# Patient Record
Sex: FEMALE | Race: OTHER
[De-identification: names, ages, dates, MRNs, and addresses within clinical notes are randomized per-mention and may not be internally consistent; named-entity substitution may affect disease eponyms.]

---

## 2018-02-09 ENCOUNTER — HOSPITAL ENCOUNTER (EMERGENCY)
Dept: HOSPITAL 17 - NEPC | Age: 68
LOS: 1 days | Discharge: HOME | End: 2018-02-10
Payer: COMMERCIAL

## 2018-02-09 VITALS
HEART RATE: 80 BPM | TEMPERATURE: 97.9 F | DIASTOLIC BLOOD PRESSURE: 83 MMHG | RESPIRATION RATE: 18 BRPM | SYSTOLIC BLOOD PRESSURE: 140 MMHG | OXYGEN SATURATION: 98 %

## 2018-02-09 VITALS — BODY MASS INDEX: 37.54 KG/M2 | HEIGHT: 67 IN | WEIGHT: 239.2 LBS

## 2018-02-09 VITALS — OXYGEN SATURATION: 95 %

## 2018-02-09 DIAGNOSIS — M54.9: ICD-10-CM

## 2018-02-09 DIAGNOSIS — E11.9: ICD-10-CM

## 2018-02-09 DIAGNOSIS — I10: ICD-10-CM

## 2018-02-09 DIAGNOSIS — R51: Primary | ICD-10-CM

## 2018-02-09 DIAGNOSIS — R94.31: ICD-10-CM

## 2018-02-09 DIAGNOSIS — R25.1: ICD-10-CM

## 2018-02-09 DIAGNOSIS — S29.012A: ICD-10-CM

## 2018-02-09 DIAGNOSIS — E78.5: ICD-10-CM

## 2018-02-09 DIAGNOSIS — R60.0: ICD-10-CM

## 2018-02-09 LAB
ALBUMIN SERPL-MCNC: 3.9 GM/DL (ref 3.4–5)
ALP SERPL-CCNC: 94 U/L (ref 45–117)
ALT SERPL-CCNC: 29 U/L (ref 10–53)
AST SERPL-CCNC: 34 U/L (ref 15–37)
BASOPHILS # BLD AUTO: 0.1 TH/MM3 (ref 0–0.2)
BASOPHILS NFR BLD: 0.6 % (ref 0–2)
BILIRUB SERPL-MCNC: 0.8 MG/DL (ref 0.2–1)
BUN SERPL-MCNC: 13 MG/DL (ref 7–18)
CALCIUM SERPL-MCNC: 8.9 MG/DL (ref 8.5–10.1)
CHLORIDE SERPL-SCNC: 102 MEQ/L (ref 98–107)
CREAT SERPL-MCNC: 0.76 MG/DL (ref 0.5–1)
EOSINOPHIL # BLD: 0.1 TH/MM3 (ref 0–0.4)
EOSINOPHIL NFR BLD: 0.7 % (ref 0–4)
ERYTHROCYTE [DISTWIDTH] IN BLOOD BY AUTOMATED COUNT: 14.7 % (ref 11.6–17.2)
GFR SERPLBLD BASED ON 1.73 SQ M-ARVRAT: 76 ML/MIN (ref 89–?)
GLUCOSE SERPL-MCNC: 97 MG/DL (ref 74–106)
HCO3 BLD-SCNC: 25.5 MEQ/L (ref 21–32)
HCT VFR BLD CALC: 44.6 % (ref 35–46)
HGB BLD-MCNC: 14.9 GM/DL (ref 11.6–15.3)
INR PPP: 1.1 RATIO
LYMPHOCYTES # BLD AUTO: 3 TH/MM3 (ref 1–4.8)
LYMPHOCYTES NFR BLD AUTO: 24.4 % (ref 9–44)
MCH RBC QN AUTO: 28 PG (ref 27–34)
MCHC RBC AUTO-ENTMCNC: 33.5 % (ref 32–36)
MCV RBC AUTO: 83.7 FL (ref 80–100)
MONOCYTE #: 1.2 TH/MM3 (ref 0–0.9)
MONOCYTES NFR BLD: 9.4 % (ref 0–8)
NEUTROPHILS # BLD AUTO: 8.1 TH/MM3 (ref 1.8–7.7)
NEUTROPHILS NFR BLD AUTO: 64.9 % (ref 16–70)
PLATELET # BLD: 228 TH/MM3 (ref 150–450)
PMV BLD AUTO: 9.3 FL (ref 7–11)
PROT SERPL-MCNC: 8.2 GM/DL (ref 6.4–8.2)
PROTHROMBIN TIME: 10.7 SEC (ref 9.8–11.6)
RBC # BLD AUTO: 5.32 MIL/MM3 (ref 4–5.3)
SODIUM SERPL-SCNC: 138 MEQ/L (ref 136–145)
TROPONIN I SERPL-MCNC: (no result) NG/ML (ref 0.02–0.05)
WBC # BLD AUTO: 12.5 TH/MM3 (ref 4–11)

## 2018-02-09 PROCEDURE — 85610 PROTHROMBIN TIME: CPT

## 2018-02-09 PROCEDURE — 81001 URINALYSIS AUTO W/SCOPE: CPT

## 2018-02-09 PROCEDURE — 96376 TX/PRO/DX INJ SAME DRUG ADON: CPT

## 2018-02-09 PROCEDURE — 84484 ASSAY OF TROPONIN QUANT: CPT

## 2018-02-09 PROCEDURE — 96361 HYDRATE IV INFUSION ADD-ON: CPT

## 2018-02-09 PROCEDURE — 80053 COMPREHEN METABOLIC PANEL: CPT

## 2018-02-09 PROCEDURE — 96374 THER/PROPH/DIAG INJ IV PUSH: CPT

## 2018-02-09 PROCEDURE — 70548 MR ANGIOGRAPHY NECK W/DYE: CPT

## 2018-02-09 PROCEDURE — 72072 X-RAY EXAM THORAC SPINE 3VWS: CPT

## 2018-02-09 PROCEDURE — 70553 MRI BRAIN STEM W/O & W/DYE: CPT

## 2018-02-09 PROCEDURE — 93005 ELECTROCARDIOGRAM TRACING: CPT

## 2018-02-09 PROCEDURE — 71275 CT ANGIOGRAPHY CHEST: CPT

## 2018-02-09 PROCEDURE — 87086 URINE CULTURE/COLONY COUNT: CPT

## 2018-02-09 PROCEDURE — 70544 MR ANGIOGRAPHY HEAD W/O DYE: CPT

## 2018-02-09 PROCEDURE — 96375 TX/PRO/DX INJ NEW DRUG ADDON: CPT

## 2018-02-09 PROCEDURE — 85730 THROMBOPLASTIN TIME PARTIAL: CPT

## 2018-02-09 PROCEDURE — 85025 COMPLETE CBC W/AUTO DIFF WBC: CPT

## 2018-02-09 PROCEDURE — 74174 CTA ABD&PLVS W/CONTRAST: CPT

## 2018-02-09 PROCEDURE — 71045 X-RAY EXAM CHEST 1 VIEW: CPT

## 2018-02-09 PROCEDURE — A9579 GAD-BASE MR CONTRAST NOS,1ML: HCPCS

## 2018-02-09 PROCEDURE — 99285 EMERGENCY DEPT VISIT HI MDM: CPT

## 2018-02-09 NOTE — RADRPT
EXAM DATE/TIME:  02/09/2018 21:34 

 

[facility] COMPARISON:     

No previous studies available for comparison.

 

                     

INDICATIONS :     

Difficulty breathing for 4 days

                     

 

MEDICAL HISTORY :     

None.          

 

SURGICAL HISTORY :     

None.   

 

ENCOUNTER:     

Initial                                        

 

ACUITY:     

4 - 6 days      

 

PAIN SCORE:     

0/10

 

LOCATION:     

Bilateral chest 

 

FINDINGS:     

A single view of the chest demonstrates the lungs to be symmetrically aerated without evidence of mas
s, infiltrate or effusion.  The cardiomediastinal contours are unremarkable.  Osseous structures are 
intact.

 

CONCLUSION:     No acute disease.  

 

 

 

 Corby Merlos MD on February 09, 2018 at 23:26           

Board Certified Radiologist.

 This report was verified electronically.

## 2018-02-09 NOTE — RADRPT
EXAM DATE/TIME:  02/09/2018 21:37 

 

[facility] COMPARISON:     

No previous studies available for comparison.

 

                     

INDICATIONS :     

Back pain for 4 days. Patient states she fell 2 weeks ago

                     

 

MEDICAL HISTORY :     

None.          

 

SURGICAL HISTORY :     

None.   

 

ENCOUNTER:     

Initial                                        

 

ACUITY:     

4 - 6 days      

 

PAIN SCORE:     

10/10

 

LOCATION:       

Thoracic spine

 

FINDINGS:     

There is normal alignment of the thoracic vertebral bodies.  Vertebral body height is maintained.  No
 evidence of fracture or subluxation.  Pedicles are intact at all levels.  The paravertebral reflecti
ons are not thickened. There is primary degenerative changes involving the mid to lower thoracic spin
e.

 

CONCLUSION:     

Primary bony degenerative changes involving the mid to lower thoracic spine.

 

 

 

 Wilman Chin MD on February 09, 2018 at 21:47           

Board Certified Radiologist.

 This report was verified electronically.

## 2018-02-09 NOTE — PD
HPI


Chief Complaint:  Headache


Time Seen by Provider:  21:20


Travel History


International Travel<30 days:  No


Contact w/Intl Traveler<30days:  No


Traveled to known affect area:  No





History of Present Illness


HPI


67-year-old female presents to the emergency department by private 

transportation of the care of her spouse for complaint of mid upper back pain 

neck pain and head pain.  Patient states she has had symptoms since Tuesday.  

Patient denies fever chills sweats nausea vomiting sore throat sinus pressure 

drainage cough congestion shortness of breath abdominal pain or new lower 

extremity numbness tingling or weakness.  Patient states that she has noted 

worsening tremor of the left upper extremity and mild tremor to the right upper 

extremity.  Patient states she has had intermittent tremor affecting the left 

upper extremity for several years after being diagnosed and treated for breast 

cancer with chemotherapy.  Patient states since Tuesday symptoms have worsened 

and are persistent and now she cannot write using her left hand and she is left-

handed.  Patient states that her pain is moderate to severe and is not improved 

after recently be seen in the emergency department and treated with ibuprofen, 

Percocet, and a Medrol Dosepak.  Patient is diabetic has history of 

hypertension dyslipidemia diabetes remote breast cancer with bilateral 

mastectomy chemotherapy and more recent a left lymph node biopsy December 2017 

that was reportedly benign as well as prior cholecystectomy hysterectomy and 

infusa-port removal.  Patient states that approximately 2 weeks ago she did 

have a near fall but did not land on her back did not injure her neck or her 

head did not hit her head did not have loss of consciousness was asymptomatic 

after the fall.  Patient states she was not having any symptoms of soreness or 

muscle spasm and had not noticed any increasing tremor of her left upper 

extremity after the fall.  No bladder or bowel dysfunction, no saddle anesthesia

, and no lower extremity numbness tingling or weakness.   Patient denies any 

lower back pain.  Patient does not report any history of diabetic neuropathy.  

No history of peripheral neuropathy.  Patient was doing well denies any injury 

or change in activity or change in medications Sunday or Monday of this week 

and then suddenly Tuesday started noticing severe head pain neck pain and upper 

back pain.  Patient was reportedly seen in the emergency department a CT brain 

noncontrast was performed and revealed no acute abnormality.  Patient was seen 

the next day by her primary care provider and no change in medications were 

provided and no further testing was performed as the provider did not 

reportedly have access to her CT imaging report.





[facility]


Past Medical History


*** Narrative Medical


Breast cancer status post bilateral mastectomy chemotherapy hypertension 

dyslipidemia diabetes hysterectomy cholecystectomy; no tobacco use no alcohol 

use and substance use; nursing notes reviewed


Cancer:  Yes


Cardiovascular Problems:  Yes (HTN)


Diabetes:  Yes (Metformin )


Patient Takes Glucophage:  Yes


Diminished Hearing:  No


Hypertension:  Yes


Immunizations Current:  Yes


Tetanus Vaccination:  > 5 Years


Influenza Vaccination:  No


Pregnant?:  Not Pregnant





Past Surgical History


Cholecystectomy:  Yes


Hysterectomy:  Yes


Mastectomy:  Yes





Social History


Alcohol Use:  No


Tobacco Use:  No


Substance Use:  No





Allergies-Medications


(Allergen,Severity, Reaction):  


Coded Allergies:  


     No Known Allergies (Verified  Allergy, Unknown, 2/7/18)


Reported Meds & Prescriptions





Reported Meds & Active Scripts


Active


Robaxin (Methocarbamol) 750 Mg Tab 750 Mg PO Q6HR


Ibuprofen 600 Mg Tab 600 Mg PO Q8HR PRN


Percocet (Oxycodone-Acetaminophen) 5-325 mg Tab 1 Tab PO Q4H PRN


Medrol Dosepak (Methylprednisolone) 4 Mg Dspk 4 Mg PO AS DIRECTED


     Per Pharmacist direction








Review of Systems


Except as stated in HPI:  all other systems reviewed are Neg


General / Constitutional:  No: Fever, Chills


Eyes:  No: Diploplia, Blurred Vision


HENT:  Positive: Headaches, Neck Pain, No: Neck Stiffness


Cardiovascular:  No: Chest Pain or Discomfort


Respiratory:  No: Shortness of Breath


Gastrointestinal:  No: Nausea, Vomiting, Abdominal Pain


Genitourinary:  No: Urgency, Frequency, Dysuria, Hesitancy, Incontinence, Flank 

Pain


Musculoskeletal:  Positive: Myalgias, Arthralgias, Edema (Upper back), Pain


Skin:  No Rash ( bilateral lower leg ankle and pedal edema)


Neurologic:  Positive: Coordination Problem (Tremor with pass pointing left 

upper extremity), Tremor, Headache, No: Weakness, Dizziness, Syncope, Focal 

Abnormalities, Ataxia, Change in Mentation, Slurred Speech, Paresthesia, 

Incontinence, Seizures, Sensory Disturbance


Psychiatric:  Positive: Anxiety


Endocrine:  No: Heat Intolerance


Hematologic/Lymphatic:  No: Easy Bruising





Physical Exam


Narrative


GENERAL: Well-developed well-nourished female no acute distress or respiratory 

distress; GCS 15


SKIN: Warm and dry.


HEAD: Atraumatic. Normocephalic. 


EYES: Pupils equal and round. No scleral icterus. No injection or drainage. 


ENT: No nasal bleeding or discharge.  Mucous membranes pink and moist.


NECK: Trachea midline. No JVD. 


CARDIOVASCULAR: Regular rate and rhythm.  


RESPIRATORY: No accessory muscle use. Clear to auscultation. Breath sounds 

equal bilaterally. 


GASTROINTESTINAL: Abdomen soft, non-tender, nondistended. Hepatic and splenic 

margins not palpable. 


MUSCULOSKELETAL: Extremities without clubbing, cyanosis, bilateral lower leg 

ankle and pedal edema bilaterally. No obvious deformities.   to 

palpation along the mid scapular thoracic spine no bony step-off no erythema no 

ecchymosis no abrasion soft tissue swelling induration or increased warmth.


NEUROLOGICAL: Awake and alert. No obvious cranial nerve deficits.  Motor 

grossly within normal limits. Five out of 5 muscle strength in the arms and 

legs.  Left upper extremity tremor with pass pointing with finger to nose 

testing.  No lower extremity limb ataxia.  No pronator drift.  No sensory 

deficit.  No clonus.  Normal speech.


PSYCHIATRIC: Appropriate mood and affect; insight and judgment normal.





Data


Data


Last Documented VS





Vital Signs








  Date Time  Temp Pulse Resp B/P (MAP) Pulse Ox O2 Delivery O2 Flow Rate FiO2


 


2/9/18 22:12     95 Room Air  


 


2/9/18 20:26 97.9 80 18     








Orders





 Orders


Electrocardiogram (2/9/18 21:20)


Prothrombin Time / Inr (Pt) (2/9/18 21:20)


Act Partial Throm Time (Ptt) (2/9/18 21:20)


Complete Blood Count With Diff (2/9/18 21:20)


Comprehensive Metabolic Panel (2/9/18 21:20)


Troponin I (2/9/18 21:20)


Urinalysis - C+S If Indicated (2/9/18 21:20)


Chest, Single Ap (2/9/18 21:20)


Ecg Monitoring (2/9/18 21:20)


Iv Access Insert/Monitor (2/9/18 21:20)


Oximetry (2/9/18 21:20)


Blood Glucose (2/9/18 21:20)


Sodium Chloride 0.9% Flush (Ns Flush) (2/9/18 21:30)


Spine, Thoracic-Ap/Lat/Sw(3vw) (2/9/18 )


Ondansetron Inj (Zofran Inj) (2/9/18 21:30)


Morphine Inj (Morphine Inj) (2/9/18 21:30)


Cta Thor Abd Aorta W Iv C W3d (2/9/18 )


Mri Brain W&W/O Contrast (2/9/18 )


Mra Brain W/O Contrast (Cow) (2/9/18 )


Mra Carotids W Contrast (2/9/18 )


Gadodiamide Pf Inj (Omniscan Pf Inj) (2/9/18 20:26)


Morphine Inj (Morphine Inj) (2/10/18 01:30)


Ketorolac Inj (Toradol Inj) (2/10/18 02:15)


Urine Culture (2/10/18 02:19)


Sodium Chlor 0.9% 1000 Ml Inj (Ns 1000 M (2/10/18 03:00)


Ed Discharge Order (2/10/18 03:56)





Labs





Laboratory Tests








Test


  2/9/18


22:21 2/10/18


02:19


 


White Blood Count 12.5 TH/MM3  


 


Red Blood Count 5.32 MIL/MM3  


 


Hemoglobin 14.9 GM/DL  


 


Hematocrit 44.6 %  


 


Mean Corpuscular Volume 83.7 FL  


 


Mean Corpuscular Hemoglobin 28.0 PG  


 


Mean Corpuscular Hemoglobin


Concent 33.5 % 


  


 


 


Red Cell Distribution Width 14.7 %  


 


Platelet Count 228 TH/MM3  


 


Mean Platelet Volume 9.3 FL  


 


Neutrophils (%) (Auto) 64.9 %  


 


Lymphocytes (%) (Auto) 24.4 %  


 


Monocytes (%) (Auto) 9.4 %  


 


Eosinophils (%) (Auto) 0.7 %  


 


Basophils (%) (Auto) 0.6 %  


 


Neutrophils # (Auto) 8.1 TH/MM3  


 


Lymphocytes # (Auto) 3.0 TH/MM3  


 


Monocytes # (Auto) 1.2 TH/MM3  


 


Eosinophils # (Auto) 0.1 TH/MM3  


 


Basophils # (Auto) 0.1 TH/MM3  


 


CBC Comment DIFF FINAL  


 


Differential Comment   


 


Prothrombin Time 10.7 SEC  


 


Prothromb Time International


Ratio 1.1 RATIO 


  


 


 


Activated Partial


Thromboplast Time 22.8 SEC 


  


 


 


Blood Urea Nitrogen 13 MG/DL  


 


Creatinine 0.76 MG/DL  


 


Random Glucose 97 MG/DL  


 


Total Protein 8.2 GM/DL  


 


Albumin 3.9 GM/DL  


 


Calcium Level 8.9 MG/DL  


 


Alkaline Phosphatase 94 U/L  


 


Aspartate Amino Transf


(AST/SGOT) 34 U/L 


  


 


 


Alanine Aminotransferase


(ALT/SGPT) 29 U/L 


  


 


 


Total Bilirubin 0.8 MG/DL  


 


Sodium Level 138 MEQ/L  


 


Potassium Level 3.8 MEQ/L  


 


Chloride Level 102 MEQ/L  


 


Carbon Dioxide Level 25.5 MEQ/L  


 


Anion Gap 11 MEQ/L  


 


Estimat Glomerular Filtration


Rate 76 ML/MIN 


  


 


 


Troponin I


  LESS THAN 0.02


NG/ML 


 


 


Urine Color  YELLOW 


 


Urine Turbidity  CLEAR 


 


Urine pH  6.0 


 


Urine Specific Gravity


  


  GREATER THAN


1.050


 


Urine Protein  TRACE mg/dL 


 


Urine Glucose (UA)  NEG mg/dL 


 


Urine Ketones  10 mg/dL 


 


Urine Occult Blood  NEG 


 


Urine Nitrite  NEG 


 


Urine Bilirubin  NEG 


 


Urine Urobilinogen


  


  LESS THAN 2.0


MG/DL


 


Urine Leukocyte Esterase  NEG 


 


Urine RBC  2 /hpf 


 


Urine WBC  2 /hpf 


 


Urine Squamous Epithelial


Cells 


  2 /hpf 


 


 


Urine Bacteria  OCC /hpf 


 


Microscopic Urinalysis Comment


  


  CATH-CULTURE


IND











MDM


Medical Decision Making


Medical Screen Exam Complete:  Yes


Emergency Medical Condition:  Yes


Medical Record Reviewed:  Yes


Interpretation(s)


EKG: Normal sinus rhythm rate 75 left axis deviation no acute ST elevation 

injury pattern or ectopy noted





Last Impressions








Chest X-Ray 2/9/18 2120 Signed





Impressions: 





 Service Date/Time:  Friday, February 9, 2018 21:34 - CONCLUSION: No acute 





 disease.       Corby Merlos MD 


 


Thoracic Spine X-Ray 2/9/18 0000 Signed





Impressions: 





 Service Date/Time:  Friday, February 9, 2018 21:37 - CONCLUSION:  Primary bony 





 degenerative changes involving the mid to lower thoracic spine.     Wilman Chin MD 





CBC & BMP Diagram


2/9/18 22:21








Total Protein 8.2, Albumin 3.9, Calcium Level 8.9, Alkaline Phosphatase 94, 

Aspartate Amino Transf (AST/SGOT) 34, Alanine Aminotransferase (ALT/SGPT) 29, 

Total Bilirubin 0.8








Vital Signs








  Date Time  Temp Pulse Resp B/P (MAP) Pulse Ox O2 Delivery O2 Flow Rate FiO2


 


2/9/18 22:12     95 Room Air  


 


2/9/18 20:26 97.9 80 18 140/83 (102) 98 Room Air  








Last Impressions








Chest X-Ray 2/9/18 2120 Signed





Impressions: 





 Service Date/Time:  Friday, February 9, 2018 21:34 - CONCLUSION: No acute 





 disease.       Corby Merlos MD 


 


Thoracic Spine X-Ray 2/9/18 0000 Signed





Impressions: 





 Service Date/Time:  Friday, February 9, 2018 21:37 - CONCLUSION:  Primary bony 





 degenerative changes involving the mid to lower thoracic spine.     Wilman Chin MD 


 


Neck Magnetic Resonance Angiography 2/9/18 0000 Signed





Impressions: 





 Service Date/Time:  Saturday, February 10, 2018 00:03 - CONCLUSION:  Normal 





 examination for a patient of this age.       Corby Merlos MD 


 


Head Magnetic Resonance Angiography 2/9/18 0000 Signed





Impressions: 





 Service Date/Time:  Saturday, February 10, 2018 00:03 - CONCLUSION:  Normal 





 examination for a patient of this age.       Corby Merlos MD 


 


Brain MRI 2/9/18 0000 Signed





Impressions: 





 Service Date/Time:  Saturday, February 10, 2018 00:03 - CONCLUSION:  Normal 





 examination for a patient of this age.       Corby Merlos MD 


 


Aorta CTA 2/9/18 0000 Signed





Impressions: 





 Service Date/Time:  Friday, February 9, 2018 23:48 - CONCLUSION:  1. Negative 





 for thoracic aortic aneurysm or dissection. Mild atherosclerotic plaque. 





 Previous cholecystectomy and breast augmentation. Minimal dependent 

atelectasis 





 in the lungs. 2. Colonic diverticulosis without diverticulitis.     Corby Merlos MD 





Urinalysis: Specific gravity greater than 1.050


Differential Diagnosis


Cephalgia, multiple sclerosis, CVA, malignancy, compression fracture, dissection

, aneurysm, SAH; unlikely meningitis or encephalitis


Narrative Course


Patient placed on cardiac monitor IV access obtained basic labs and imaging 

studies ordered concern for this patient having mid thoracic pain for MI aortic 

dissection aortic aneurysm also concerning for posterior circulation dissection 

with related CVA also to be of concern for metastatic disease therefore will 

need MRI and most likely CTA





EKG no acute injury pattern changed chest x-ray no acute process in thoracic 

spine x-ray shows chronic degenerative changes; patient sent for CTA of the 

thoracic and abdominal aorta in view of possible posterior circulation 

vasculature dissection CT not possible with CTA of thorax abdomen and pelvis 

performed therefore MRI of the brain with and without contrast MRA of the brain 

without contrast and MRA of the cervical spine with contrast to evaluate [place] 

of [place] and cervical vasculature was ordered





Imaging studies reveal no acute process patient is aware that she will need to 

follow-up with neurologist regarding her left upper extremity tremor and is to 

follow-up with her primary care provider regarding her back pain and headache.  

Patient is given copies of her imaging reports.  Patient encouraged to return 

the emergency department for any concerns or change in condition.  Patient was 

identified to have very high specific gravity on urinalysis greater than 1.050 

and given a normal saline patient also informed that she is not to use any 

metformin for 72 hours after receiving IV contrast.  Patient is stable for 

outpatient management at this time and pain is under better control patient 

given prescription for muscle relaxant for musculoskeletal pain.





Diagnosis





 Primary Impression:  


 Cephalgia


 Qualified Codes:  R51 - Headache


 Additional Impressions:  


 Thoracic myofascial strain


 Qualified Codes:  S29.019D - Strain of muscle and tendon of unspecified wall 

of thorax, subsequent encounter


 Tremor of unknown origin


Referrals:  


Neurologist


call for appointment





Primary Care Physician


3 days


Patient Instructions:  General Instructions, Narcotic given in the ED





***Additional Instructions:  


Continue current medications as presently prescribed


May use muscle relaxant as prescribed as needed


Follow-up with your primary care provider call office on Monday


Follow-up with neurologist


Return to the emergency room for concerns or change in condition


Monitor temperature every 4 hours with thermometer to take acetaminophen as 

needed for fever 100.4F or greater


Return to the emergency department for any concerns or change in condition or 

development of fever or vomiting


Increase fluid hydration


Do not take any metformin for 72 hours from time of receiving contrast


***Med/Other Pt SpecificInfo:  Prescription(s) given


Scripts


Methocarbamol (Robaxin) 750 Mg Tab


750 MG PO Q6HR for Muscle Spasm, #12 TAB 0 Refills


   Prov: Nicole Khoury MD         2/10/18


Disposition:  01 DISCHARGE HOME


Condition:  Stable











Nicole Khoury MD Feb 9, 2018 21:44

## 2018-02-10 LAB
BACTERIA #/AREA URNS HPF: (no result) /HPF
COLOR UR: YELLOW
GLUCOSE UR STRIP-MCNC: (no result) MG/DL
HGB UR QL STRIP: (no result)
KETONES UR STRIP-MCNC: 10 MG/DL
NITRITE UR QL STRIP: (no result)
SP GR UR STRIP: (no result) (ref 1–1.03)
SQUAMOUS #/AREA URNS HPF: 2 /HPF (ref 0–5)
URINE LEUKOCYTE ESTERASE: (no result)

## 2018-02-10 NOTE — RADRPT
EXAM DATE/TIME:  02/10/2018 00:03 

 

[facility] COMPARISON:     

No previous studies available for comparison.

       

 

 

INDICATIONS :     

Cephalgia. History of breast ca.

                     

 

CONTRAST:     

20 cc Omniscan (gadodiamide) IV

                     

 

MEDICAL HISTORY :     

Carcinoma, breast. Hypertension. Diabetes mellitus type 2. 

 

SURGICAL HISTORY :     

Mastectomy, bilateral. Hysterectomy.   

 

ENCOUNTER:     

Initial

 

ACUITY:     

2 day

 

PAIN SCORE:     

5/10

 

LOCATION:         

head

 

TECHNIQUE:     

Multiplanar, multisequence MRI of the brain was performed both prior to and following the administrat
ion of paramagnetic contrast.

 

FINDINGS:     

 

CEREBRUM:     

The ventricles are normal for age.  No evidence of midline shift, mass lesion, hemorrhage or acute in
farction.  No extraaxial fluid collections are seen.  The pituitary gland and suprasellar cistern are
 normal in configuration.

 

WHITE MATTER:     

No significant signal abnormalities are seen in the white matter.

 

POSTERIOR FOSSA:     

The cerebellum and brainstem are intact.  The 4th ventricle is midline. The cerebellopontine angle is
 unremarkable.  The cerebellar tonsils are normal in position.

 

DIFFUSION IMAGING:     

No focal areas of restricted diffusion are seen.  No evidence of acute infarction.

 

EXTRACRANIAL:     

The visualized portions of the orbits and paranasal sinuses are unremarkable.

 

POST-CONTRAST:     

No abnormal areas of parenchymal or dural enhancement.  No evidence of blood-brain barrier breakdown.


 

CONCLUSION:     

Normal examination for a patient of this age.  

 

 

 

 Corby Merlos MD on February 10, 2018 at 0:56           

Board Certified Radiologist.

 This report was verified electronically.

## 2018-02-10 NOTE — RADRPT
EXAM DATE/TIME:  02/10/2018 00:03 

 

[facility] COMPARISON:     

No previous studies available for comparison.

       

 

 

INDICATIONS :     

Cephalgia.

                     

 

MEDICAL HISTORY :     

Carcinoma, breast. Diabetes mellitus type 2. Hypertension. 

 

SURGICAL HISTORY :     

Mastectomy, bilateral. Hysterectomy.   

 

ENCOUNTER:     

Initial

 

ACUITY:     

2 day

 

PAIN SCORE:     

5/10

 

LOCATION:         

head

 

Please note a normal MRA of the brain does not entirely exclude the possibility of a small aneurysm, 


nor the possibility of distal intracranial vessel disease.

 

TECHNIQUE:     

3D time of flight MRA was performed.  Source images, multiplanar STS MIP, and 3D volume MIP reconstru
ctions were reviewed.

 

FINDINGS:     

There is excellent visualization of the major intracranial arteries out to the second-order branch ve
ssels.  There is no evidence for aneurysm, vessel truncation or stenosis, and no evidence for vascula
r malformation.

 

CONCLUSION:     

Normal examination for a patient of this age.  

 

 

 

 Corby Merlos MD on February 10, 2018 at 0:51           

Board Certified Radiologist.

 This report was verified electronically.

## 2018-02-10 NOTE — EKG
Date Performed: 02/09/2018       Time Performed: 22:09:41

 

PTAGE:      67 years

 

EKG:      Sinus rhythm 

 

 MARKED LEFT AXIS DEVIATION ABNORMAL ECG 

 

NO PREVIOUS TRACING            

 

DOCTOR:   Angel Grubbs  Interpretating Date/Time  02/10/2018 23:41:59

## 2018-02-10 NOTE — RADRPT
EXAM DATE/TIME:  02/09/2018 23:48 

 

[facility] COMPARISON:     

No previous studies available for comparison.

 

 

INDICATIONS :     

Upper back pain.

                           

 

IV CONTRAST:     

100 cc Omnipaque 350 (iohexol) IV 

 

 

RADIATION DOSE:     

9.4 CTDIvol (mGy) 

 

 

MEDICAL HISTORY :     

Carcinoma, breast. Cardiovascular disease Hypertension.

 

SURGICAL HISTORY :      

Mastectomy, bilateral. 

 

ENCOUNTER:      

Initial

 

ACUITY:      

1 day

 

PAIN SCALE:      

7/10

 

LOCATION:       

Bilateral  abdomen

 

TECHNIQUE:     

Volumetric scanning was performed using a multi-row detector CT scanner.  The data was post processed
 with a variety of visualization algorithms including full volume maximum intensity projection, multi
-planar sliding thin slab reformation, curved planar reformation, and surface rendering techniques.  
Using automated exposure control and adjustment of the mA and/or kV according to patient size, radiat
ion dose was kept as low as reasonably achievable to obtain optimal diagnostic quality images.  DICOM
 format image data is available electronically for review and comparison.  

 

FINDINGS:     

 

LUNGS:     

There is no consolidation or pneumothorax.  No concerning pulmonary nodule is visualized.  No pleural
 fluid is present.

 

MEDIASTINUM:     

No abnormally enlarged lymph nodes by CT criteria. No axillary or hilar abnormalities are identified.
 

 

ABDOMEN:     

The liver and spleen are free of focal defects. Cholecystectomy. The adrenal glands are normal. The k
idneys demonstrate no evidence of solid renal mass or hydronephrosis. No free fluid or abdominal mass
es are identified. No para-aortic adenopathy is seen.

 

PELVIS:     

No evidence of free fluid or pelvic mass. No abnormally enlarged inguinal or retroperitoneal lymph no
brandon are present. The bladder is unremarkable.

 

THORACIC AORTA:       

The thoracic aortic root is normal with normal branching of the great vessels.  There is no evidence 
of aneurysm or dissection. 

 

ABDOMINAL AORTA:     

The aorta is normal in caliber without aneurysm or dissection.  The renal arteries are patent bilater
ally.  The proximal celiac and superior mesenteric arteries are patent and normal in diameter.   

 

PELVIC VESSELS:     

The internal iliac and external iliac vessels are patent without aneurysm or stenosis.

 

CONCLUSION:     

1. Negative for thoracic aortic aneurysm or dissection. Mild atherosclerotic plaque. Previous cholecy
stectomy and breast augmentation. Minimal dependent atelectasis in the lungs.

2. Colonic diverticulosis without diverticulitis.

 

 

 

 Corby Merlos MD on February 10, 2018 at 0:08           

Board Certified Radiologist.

 This report was verified electronically.